# Patient Record
Sex: FEMALE | Race: BLACK OR AFRICAN AMERICAN | NOT HISPANIC OR LATINO
[De-identification: names, ages, dates, MRNs, and addresses within clinical notes are randomized per-mention and may not be internally consistent; named-entity substitution may affect disease eponyms.]

---

## 2021-08-09 PROBLEM — Z00.00 ENCOUNTER FOR PREVENTIVE HEALTH EXAMINATION: Status: ACTIVE | Noted: 2021-08-09

## 2021-08-10 ENCOUNTER — APPOINTMENT (OUTPATIENT)
Dept: OTOLARYNGOLOGY | Facility: CLINIC | Age: 21
End: 2021-08-10
Payer: COMMERCIAL

## 2021-08-10 VITALS
SYSTOLIC BLOOD PRESSURE: 111 MMHG | BODY MASS INDEX: 25.61 KG/M2 | HEART RATE: 94 BPM | DIASTOLIC BLOOD PRESSURE: 70 MMHG | HEIGHT: 64 IN | WEIGHT: 150 LBS | OXYGEN SATURATION: 97 % | TEMPERATURE: 98 F

## 2021-08-10 DIAGNOSIS — J33.9 NASAL POLYP, UNSPECIFIED: ICD-10-CM

## 2021-08-10 DIAGNOSIS — Z82.49 FAMILY HISTORY OF ISCHEMIC HEART DISEASE AND OTHER DISEASES OF THE CIRCULATORY SYSTEM: ICD-10-CM

## 2021-08-10 DIAGNOSIS — J32.9 CHRONIC SINUSITIS, UNSPECIFIED: ICD-10-CM

## 2021-08-10 DIAGNOSIS — F12.90 CANNABIS USE, UNSPECIFIED, UNCOMPLICATED: ICD-10-CM

## 2021-08-10 DIAGNOSIS — F17.200 NICOTINE DEPENDENCE, UNSPECIFIED, UNCOMPLICATED: ICD-10-CM

## 2021-08-10 DIAGNOSIS — Z83.3 FAMILY HISTORY OF DIABETES MELLITUS: ICD-10-CM

## 2021-08-10 PROCEDURE — 99244 OFF/OP CNSLTJ NEW/EST MOD 40: CPT | Mod: 25

## 2021-08-10 PROCEDURE — 31231 NASAL ENDOSCOPY DX: CPT

## 2021-08-10 RX ORDER — BUDESONIDE, MICRONIZED 100 %
POWDER (GRAM) MISCELLANEOUS
Qty: 120 | Refills: 3 | Status: ACTIVE | COMMUNITY
Start: 2021-08-10 | End: 1900-01-01

## 2021-08-10 NOTE — CONSULT LETTER
[Dear  ___] : Dear  [unfilled], [( Thank you for referring [unfilled] for consultation for _____ )] : Thank you for referring [unfilled] for consultation for [unfilled] [Please see my note below.] : Please see my note below. [Consult Closing:] : Thank you very much for allowing me to participate in the care of this patient.  If you have any questions, please do not hesitate to contact me. [Sincerely,] : Sincerely, [FreeTextEntry2] : Francis Porter MD ([place], [place])\par  [FreeTextEntry3] : Adan Espinoza MD\par Sinus & Endoscopic Skull Base Surgery\par Department of Otolaryngology- Head & Neck Surgery\par [facility]\par [facility]\par \par Phone: [phone]\par Fax: [phone]\par

## 2021-08-10 NOTE — HISTORY OF PRESENT ILLNESS
[Nasal Congestion] : nasal congestion [de-identified] : Ms. 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 Francis Porter\par Reports progressive symptoms over last 6-8mo including near constant nasal congestion (bilateral in nature), intermittent clear anterior rhinorrhea and PND, facial pain and pressure (max, ethmoid)\par Minimal sense of smell/taste but it "comes and goes"\par Tried INCS without benefit\par \par CT sinus ([place])-- reviewed personally--  pansinus disease predominately involving bilateral ethmoids, frontals-- hypoplastic R frontal\par \par Denies asthma\par Denies insensitivity to NSAIDs\par \par PMH: denies\par PSH: denies\par Meds: denies

## 2021-08-10 NOTE — DATA REVIEWED
[de-identified] : CT-MAXIFACIAL SINUS NON CONTRAST\par \par History: J32.0 Chronic maxillary sinusitis\par \par No prior\par \par Maxillofacial CT was performed with multislice axial images and reconstructions\par performed in axial, sagittal and coronal planes. This study was performed using\par automatic exposure control and an iterative reconstruction technique (radiation\par dose reduction software) to obtain a diagnostic image quality scan with patient\par dose as low as reasonably achievable. The mA and kV were adjusted according to\par patient size. The administered radiation dose was .884 mSv.\par \par FINDINGS:\par \par FRONTAL SINUSES, DRAINAGE PATHWAYS, AND ASSOCIATED ANATOMIC VARIANTS:\par There is severe bilateral frontal sinus disease\par \par ETHMOID SINUSES:\par The ethmoid sinuses are moderate to severely opacified. The lamina papyracea are\par intact. The ethmoid roofs are symmetric.\par \par SPHENOID SINUSES, DRAINAGE PATHWAYS, AND ASSOCIATED VARIANTS:\par There is severe bilateral sphenoid sinus disease with left-sided bubbly\par secretions. The sphenoethmoidal recesses are occluded\par \par MAXILLARY SINUSES, DRAINAGE PATHWAYS, AND ASSOCIATED VARIANTS:\par Mild bilateral maxillary sinus disease is seen with air-fluid levels and\par loculated mucosal thickening. The ostiomeatal units are occluded and bordered\par laterally by ethmoid air cells\par \par NASAL SEPTUM/NASAL CAVITY:\par The nasal septum is deviated to the left. Bilateral nasal cavities are\par opacified reaching the cribriform plate on the right\par \par MASTOID AIR CELLS:\par The mastoid air cells are well developed and aerated.\par \par OTHER:\par Limited evaluation of the brain parenchyma is unremarkable.\par \par The adenoids are enlarged.\par \par IMPRESSION:\par \par \par Mild bilateral maxillary sinus disease with air-fluid levels and occluded\par ostiomeatal units\par \par Severe bilateral frontal sinus disease\par \par Severe sphenoid sinus disease with left sided secretions\par \par Moderate to severe bilateral ethmoid sinus disease.